# Patient Record
(demographics unavailable — no encounter records)

---

## 2025-01-31 NOTE — PROCEDURE
[FreeTextEntry1] : RLE wound was cleansed with hydrogen Peroxide and Chlorhexidine solution, skin of the lower leg and foot was moisturized with vit. A&D ointment, Silvadene ointment was applied to the wound, leg was wrapped with kerlix and ace bandage.

## 2025-01-31 NOTE — ASSESSMENT
[Foot care/Footwear] : foot care/footwear [Ulcer Care] : ulcer care [FreeTextEntry1] : 73yoM former smoker with PMHx of HLD and LLE BKA years ago, previous hx of RLE cellulitis, R shin traumatic ulcer that was healed with unna boot returns for an evaluation of a new RLE wound that he developed few weeks ago. On exam, patient appears to be more disheveled than usual, poor hygiene. R lateral aspect with ~10x8 cm wound with fibrinous exudate, surrounding dry scaly skin and erythema, non-tender to palpation, poor hygiene. RLE wound was cleansed with hydrogen Peroxide and Chlorhexidine solution, skin of the lower leg and foot was moisturized with vit. A&D ointment, Silvadene ointment was applied to the wound, leg was wrapped with kerlix and ace bandage.  We recommended to keep the dressing on for a week and remove it before coming to the office in 1 week. Prior to his appointment he was instructed to take a shower and wash his right leg/foot really well. We will start him on Augmentin and arrange for VNS as well. If he develops pain, fever, chills, he should go to ED right away.

## 2025-01-31 NOTE — REVIEW OF SYSTEMS
[Limb Swelling] : limb swelling [As Noted in HPI] : as noted in HPI [Skin Lesions] : skin lesion [Negative] : Heme/Lymph [Fever] : no fever [Chills] : no chills [Limb Pain] : no limb pain

## 2025-01-31 NOTE — PHYSICAL EXAM
[Respiratory Effort] : normal respiratory effort [Normal Rate and Rhythm] : normal rate and rhythm [2+] : right 2+ [Ankle Swelling (On Exam)] : present [Ankle Swelling On The Right] : of the right ankle [Ankle Swelling On The Left] : moderate [Alert] : alert [Calm] : calm [Abdomen Tenderness] : ~T ~M No abdominal tenderness [de-identified] : appears more disheveled than before, NAD, ambulates with a cane [de-identified] : +FROM, L SAMMA prosthesis [de-identified] : R lateral aspect with ~10x8 cm wound with fibrinous exudate, surrounding dry scaly skin and erythema, non-tender to palpation, poor hygiene

## 2025-01-31 NOTE — HISTORY OF PRESENT ILLNESS
[FreeTextEntry1] : 73yoM former smoker with PMHx of HLD and LLE BKA years ago, previous hx of RLE cellulitis, R shin traumatic ulcer that was healed with unna boot returns for an evaluation of a new RLE wound that he developed few weeks ago. Patient states that he tried to take care of it himself, however unsuccessful. He denies pain, fever, chills.

## 2025-02-07 NOTE — HISTORY OF PRESENT ILLNESS
[FreeTextEntry1] : 73yoM former smoker with PMHx of HLD and LLE BKA years ago, previous hx of RLE cellulitis, R shin traumatic ulcer that was healed with connie bledsoe who presented last week for an evaluation of a new RLE wound that he developed few weeks ago. He was started on PO antibiotics and after cleansing the wound Silvadene ointment was applied, and the leg was wrapped with kerlix/ace. He returns today for a follow up and states that he feels better, denies pain, fever, chills.

## 2025-02-07 NOTE — PHYSICAL EXAM
[Respiratory Effort] : normal respiratory effort [Normal Rate and Rhythm] : normal rate and rhythm [2+] : right 2+ [Ankle Swelling (On Exam)] : present [Ankle Swelling On The Right] : of the right ankle [Ankle Swelling On The Left] : moderate [Alert] : alert [Calm] : calm [Abdomen Tenderness] : ~T ~M No abdominal tenderness [de-identified] : appears more disheveled than before, NAD, ambulates with a cane [de-identified] : +FROM, L SAMMA prosthesis [de-identified] : R lateral aspect with ~10x8 cm wound with good granulation tissue, improved erythema, non-tender to palpation

## 2025-02-07 NOTE — ASSESSMENT
[Foot care/Footwear] : foot care/footwear [Ulcer Care] : ulcer care [FreeTextEntry1] : 73yoM former smoker with PMHx of HLD and LLE BKA years ago, returns for a f/u on a new RLE wound that he developed few weeks ago. On exam, R lateral aspect with ~10x8 cm wound with good granulation tissue, improved erythema, non-tender to palpation. RLE wound was cleansed with hydrogen Peroxide and Chlorhexidine solution, skin of the lower leg and foot was moisturized with vit. A&D ointment, Silvadene ointment was applied to the wound, leg was wrapped with kerlix and ace bandage.  F/u in 1 week.

## 2025-02-07 NOTE — PROCEDURE
[FreeTextEntry1] : RLE wound was cleansed with hydrogen Peroxide and Chlorhexidine solution, Silvadene ointment was applied to the wound, leg was strapped with unna boot, secured with kerlix and ace bandage.

## 2025-02-14 NOTE — PROCEDURE
[FreeTextEntry1] : RLE wound was cleansed with hydrogen Peroxide and Chlorhexidine solution, leg was strapped with unna boot, secured with kerlix and ace bandage.

## 2025-02-14 NOTE — REVIEW OF SYSTEMS
[Fever] : no fever [Chills] : no chills [Limb Pain] : no limb pain [Limb Swelling] : limb swelling [As Noted in HPI] : as noted in HPI [Skin Lesions] : skin lesion [Negative] : Heme/Lymph

## 2025-02-14 NOTE — HISTORY OF PRESENT ILLNESS
[FreeTextEntry1] : 73yoM former smoker with PMHx of HLD and LLE BKA years ago, previous hx of RLE cellulitis, R shin traumatic ulcer that was healed with connie bledsoe who presented last week for an evaluation of a new RLE wound that he developed few weeks ago. He returns today for a follow up and states that he feels better, denies pain, fever, chills.

## 2025-02-14 NOTE — PHYSICAL EXAM
[Respiratory Effort] : normal respiratory effort [Normal Rate and Rhythm] : normal rate and rhythm [2+] : right 2+ [Ankle Swelling (On Exam)] : present [Ankle Swelling On The Right] : of the right ankle [Ankle Swelling On The Left] : moderate [Abdomen Tenderness] : ~T ~M No abdominal tenderness [Alert] : alert [Calm] : calm [de-identified] :  NAD, ambulates with a cane [de-identified] : +FROM, L SAMMA prosthesis [de-identified] : R lateral aspect with ~10x8 cm wound with good granulation tissue, improved erythema, non-tender to palpation

## 2025-02-14 NOTE — ASSESSMENT
[Foot care/Footwear] : foot care/footwear [FreeTextEntry1] : 73yoM former smoker with PMHx of HLD and LLE BKA years ago, returns for a f/u on a new RLE wound that he developed few weeks ago. On exam, R lateral aspect with ~10x8 cm wound with good granulation tissue, improved erythema, non-tender to palpation. RLE wound was cleansed with hydrogen Peroxide and Chlorhexidine solution, skin of the lower leg and foot was moisturized with vit. A&D ointment, leg was wrapped with kerlix and ace bandage.  F/u in 1 week. [Ulcer Care] : ulcer care

## 2025-02-20 NOTE — ASSESSMENT
[FreeTextEntry1] : 73yoM former smoker with PMHx of HLD and LLE BKA years ago, returns for a f/u on a new RLE wound that he developed few weeks ago. On exam, R lateral aspect with ~10x8 cm wound with good granulation tissue, improved erythema, non-tender to palpation. RLE wound was cleansed with hydrogen Peroxide and Chlorhexidine solution, skin of the lower leg and foot was moisturized with vit. A&D ointment, leg was wrapped with kerlix and ace bandage.  F/u in 1 week. [Foot care/Footwear] : foot care/footwear [Ulcer Care] : ulcer care

## 2025-02-20 NOTE — PHYSICAL EXAM
[Respiratory Effort] : normal respiratory effort [Normal Rate and Rhythm] : normal rate and rhythm [2+] : right 2+ [Ankle Swelling (On Exam)] : present [Ankle Swelling On The Right] : of the right ankle [Ankle Swelling On The Left] : moderate [Abdomen Tenderness] : ~T ~M No abdominal tenderness [Alert] : alert [Calm] : calm [de-identified] :  NAD, ambulates with a cane [de-identified] : +FROM, L SAMMA prosthesis [de-identified] : R lateral aspect with ~10x8 cm wound with good granulation tissue, improved erythema, non-tender to palpation [de-identified] : grossly intact

## 2025-02-27 NOTE — PROCEDURE
[FreeTextEntry1] : RLE wound was cleansed with hydrogen Peroxide and Chlorhexidine solution, leg was wrapped with kerlix and ace bandage.

## 2025-02-27 NOTE — ASSESSMENT
[FreeTextEntry1] : 73yoM former smoker with PMHx of HLD and LLE BKA years ago, returns for a f/u on a new RLE wound that he developed few weeks ago. On exam, R lateral aspect with ~7x8 cm wound with good granulation tissue, partially epithelized, non-tender to palpation RLE wound was cleansed with hydrogen Peroxide and Chlorhexidine solution, leg was wrapped with kerlix and ace bandage.  F/u in 1 week. [Foot care/Footwear] : foot care/footwear [Ulcer Care] : ulcer care

## 2025-02-27 NOTE — PHYSICAL EXAM
[Respiratory Effort] : normal respiratory effort [Normal Rate and Rhythm] : normal rate and rhythm [2+] : right 2+ [Ankle Swelling (On Exam)] : present [Ankle Swelling On The Right] : of the right ankle [Ankle Swelling On The Left] : moderate [Abdomen Tenderness] : ~T ~M No abdominal tenderness [Alert] : alert [Calm] : calm [de-identified] :  NAD, ambulates with a cane [de-identified] : +FROM, L SAMMA prosthesis [de-identified] : R lateral aspect with ~7x8 cm wound with good granulation tissue, partially epithelized,  non-tender to palpation [de-identified] : grossly intact

## 2025-03-06 NOTE — PHYSICAL EXAM
[Respiratory Effort] : normal respiratory effort [Normal Rate and Rhythm] : normal rate and rhythm [2+] : right 2+ [Ankle Swelling (On Exam)] : present [Ankle Swelling On The Right] : of the right ankle [Ankle Swelling On The Left] : moderate [Abdomen Tenderness] : ~T ~M No abdominal tenderness [Alert] : alert [Calm] : calm [de-identified] :  NAD, ambulates with a cane [de-identified] : +FROM, L SAMMA prosthesis [de-identified] : R lateral aspect with ~7x8 cm wound with good granulation tissue, partially epithelized,  non-tender to palpation [de-identified] : grossly intact

## 2025-03-06 NOTE — ASSESSMENT
[FreeTextEntry1] : 73yoM former smoker with PMHx of HLD and LLE BKA years ago, returns for a f/u on a new RLE wound that he developed few weeks ago. On exam, R lateral aspect with ~7x8 cm wound with good granulation tissue, partially epithelized, non-tender to palpation RLE wound was cleansed with hydrogen Peroxide and Chlorhexidine solution, Silvadene ointment was applied to the ulcer, leg was wrapped with kerlix and ace bandage.  We provided pt information about wound care specialists and recommended to schedule an appointment with one of them. F/u in 1 week. [Foot care/Footwear] : foot care/footwear [Ulcer Care] : ulcer care

## 2025-03-06 NOTE — PROCEDURE
[FreeTextEntry1] : RLE wound was cleansed with hydrogen Peroxide and Chlorhexidine solution, lSilvadene ointment was applied to the ulcer, leg was wrapped with kerlix and ace bandage.

## 2025-03-13 NOTE — PROCEDURE
[FreeTextEntry1] : RLE: Silvadene ointment was applied to the ulcer, leg was wrapped with kerlix and ace bandage.

## 2025-03-13 NOTE — HISTORY OF PRESENT ILLNESS
[FreeTextEntry1] : 73yoM former smoker with PMHx of HLD and LLE BKA years ago, previous hx of RLE cellulitis, R shin traumatic ulcer that was healed with unna boot who presented last week for an evaluation of a new RLE wound that he developed few weeks ago. He returns today for a follow up and dressing change, denies pain, fever, chills. Patient scheduled an appointment with a wound care center in 2 weeks.

## 2025-03-13 NOTE — PHYSICAL EXAM
[Respiratory Effort] : normal respiratory effort [Normal Rate and Rhythm] : normal rate and rhythm [2+] : right 2+ [Ankle Swelling (On Exam)] : present [Ankle Swelling On The Right] : of the right ankle [Ankle Swelling On The Left] : moderate [Abdomen Tenderness] : ~T ~M No abdominal tenderness [Alert] : alert [Calm] : calm [de-identified] :  NAD, ambulates with a cane [de-identified] : R lateral aspect with ~3x4 cm wound with good granulation tissue, partially epithelized,  non-tender to palpation [de-identified] : +FROM, L SAMMA prosthesis [de-identified] : grossly intact

## 2025-03-13 NOTE — ASSESSMENT
[FreeTextEntry1] : 73yoM former smoker with PMHx of HLD and LLE BKA years ago, returns for a f/u on a new RLE wound that he developed few weeks ago. On exam, R lateral aspect with ~3x4 cm wound with good granulation tissue, partially epithelized, non-tender to palpation Silvadene ointment was applied to the RLE ulcer, leg was wrapped with kerlix and ace bandage.   F/u in 1 week. [Foot care/Footwear] : foot care/footwear [Ulcer Care] : ulcer care

## 2025-03-20 NOTE — HISTORY OF PRESENT ILLNESS
[FreeTextEntry1] : 73yoM former smoker with PMHx of HLD and LLE BKA years ago, previous hx of RLE cellulitis, R shin traumatic ulcer that was healed with unna renoot who presented last week for an evaluation of a new RLE wound that he developed few weeks ago. He returns today for a follow up and dressing change, denies pain, fever, chills. Patient scheduled an appointment with a wound care center next week.

## 2025-03-20 NOTE — PHYSICAL EXAM
[Respiratory Effort] : normal respiratory effort [Normal Rate and Rhythm] : normal rate and rhythm [2+] : right 2+ [Ankle Swelling (On Exam)] : present [Ankle Swelling On The Right] : of the right ankle [Ankle Swelling On The Left] : moderate [Abdomen Tenderness] : ~T ~M No abdominal tenderness [Alert] : alert [Calm] : calm [de-identified] :  NAD, ambulates with a cane [de-identified] : +FROM, L SAMMA prosthesis [de-identified] : R lateral aspect with ~2x1 cm wound with good granulation tissue, partially epithelized,  non-tender to palpation [de-identified] : grossly intact

## 2025-03-20 NOTE — ASSESSMENT
[FreeTextEntry1] : 73yoM former smoker with PMHx of HLD and LLE BKA years ago, returns for a f/u on a new RLE wound that he developed few weeks ago. He denies pain, fever, chills. On exam, R lateral aspect with ~2x1 cm wound with good granulation tissue, partially epithelized, non-tender to palpation. RLE was strapped with unna boot, secured with kerlix and ace bandage. Patient will see a wound care specialist next week and f/u here prn.  [Foot care/Footwear] : foot care/footwear [Ulcer Care] : ulcer care

## 2025-04-05 NOTE — ASSESSMENT
[FreeTextEntry1] : 73yoM former smoker with PMHx of HLD and LLE BKA years ago, returns for a f/u on a new RLE wound that he developed. He denies pain, fever, chills. On exam, R lateral aspect with ~2x1 cm wound with good granulation tissue, partially epithelized, non-tender to palpation. Silvadene ointment was applied to the wound, RLE was strapped with unna boot, secured with kerlix and ace bandage. Patient will f/u next week.  [Foot care/Footwear] : foot care/footwear [Ulcer Care] : ulcer care

## 2025-04-05 NOTE — HISTORY OF PRESENT ILLNESS
[FreeTextEntry1] : 73yoM former smoker with PMHx of HLD and LLE BKA years ago, previous hx of RLE cellulitis, R shin traumatic ulcer that was healed with unna boot who presented 2 months ago due to a new RLE wound. He has been treated with oral abx's, and local wound care on a weekly basis and was referred to a wound care center. After he was evaluated at [facility] he was recommended to continue unna boot wrappings. He returns today for a follow up and dressing change. He states that his wound is healing well, denies pain, bleeding or oozing from the wound, fever, chills.

## 2025-04-05 NOTE — PHYSICAL EXAM
[Respiratory Effort] : normal respiratory effort [Normal Rate and Rhythm] : normal rate and rhythm [2+] : right 2+ [Ankle Swelling (On Exam)] : present [Ankle Swelling On The Right] : of the right ankle [Ankle Swelling On The Left] : moderate [Abdomen Tenderness] : ~T ~M No abdominal tenderness [Alert] : alert [Calm] : calm [de-identified] :  NAD, ambulates with a cane [de-identified] : +FROM, L SAMMA prosthesis [de-identified] : R lateral aspect with ~2x1 cm wound with good granulation tissue, partially epithelized,  non-tender to palpation [de-identified] : grossly intact

## 2025-04-05 NOTE — PROCEDURE
[FreeTextEntry1] : Silvadene ointment was applied to the wound, RLE was strapped with unna boot, secured with kerlix and ace bandage.

## 2025-04-10 NOTE — HISTORY OF PRESENT ILLNESS
[FreeTextEntry1] : 73yoM former smoker with PMHx of HLD and LLE BKA years ago, previous hx of RLE cellulitis, R shin traumatic ulcer that was healed with unna boot who presented 2 months ago due to a new RLE wound. He has been treated with oral abx's, and local wound care on a weekly basis and was referred to a wound care center. After he was evaluated at [facility] he was recommended to continue unna boot wrappings. He returns today for a follow up and dressing change. He states that his wound is healing well, denies pain, bleeding or oozing from the wound, fever, chills.

## 2025-04-10 NOTE — PHYSICAL EXAM
[Respiratory Effort] : normal respiratory effort [Normal Rate and Rhythm] : normal rate and rhythm [2+] : right 2+ [Ankle Swelling (On Exam)] : present [Ankle Swelling On The Right] : of the right ankle [Ankle Swelling On The Left] : moderate [Abdomen Tenderness] : ~T ~M No abdominal tenderness [Alert] : alert [Calm] : calm [de-identified] :  NAD, ambulates with a cane [de-identified] : +FROM, L SAMMA prosthesis [de-identified] : R lateral aspect with ~2x1 cm wound with good granulation tissue, partially epithelized,  non-tender to palpation [de-identified] : grossly intact

## 2025-04-10 NOTE — ASSESSMENT
[FreeTextEntry1] : 73yoM former smoker with PMHx of HLD and LLE BKA years ago, returns for a f/u on a new RLE wound that he developed. He denies pain, fever, chills. On exam, R lateral aspect with ~2x1 cm wound with good granulation tissue, partially epithelized, non-tender to palpation. RLE was strapped with unna boot, secured with kerlix and ace bandage. Patient will f/u next week.  [Foot care/Footwear] : foot care/footwear [Ulcer Care] : ulcer care

## 2025-04-18 NOTE — ASSESSMENT
[FreeTextEntry1] : 73yoM former smoker with PMHx of HLD and LLE BKA years ago, returns for a f/u on a new RLE wound that he developed. He denies pain, fever, chills. On exam, R lateral aspect with ~1.5x1 cm wound with good granulation tissue, partially epithelized, non-tender to palpation. RLE was strapped with unna boot, secured with kerlix and ace bandage. Patient will f/u next week.  [Foot care/Footwear] : foot care/footwear [Ulcer Care] : ulcer care

## 2025-04-18 NOTE — PHYSICAL EXAM
[Respiratory Effort] : normal respiratory effort [Normal Rate and Rhythm] : normal rate and rhythm [2+] : right 2+ [Ankle Swelling (On Exam)] : present [Ankle Swelling On The Right] : mild [Alert] : alert [Calm] : calm [de-identified] :  NAD, ambulates with a cane [de-identified] : +FROM, L SAMMA prosthesis [de-identified] : R lateral aspect with ~1.5x1 cm wound with good granulation tissue, partially epithelized,  non-tender to palpation [de-identified] : grossly intact

## 2025-04-18 NOTE — HISTORY OF PRESENT ILLNESS
[FreeTextEntry1] : 73yoM former smoker with PMHx of HLD and LLE BKA years ago, previous hx of RLE cellulitis, R shin traumatic ulcer that was healed with unna boot who presented 2 months ago due to a new RLE wound. He has been treated with oral abx's, and local wound care on a weekly basis and was referred to a wound care center. After he was evaluated at [facility] he was recommended to continue unna boot wrappings. He returns today for a follow up and dressing change. He states that his wound is healing well, denies pain, bleeding or oozing from the wound, fever, chills.

## 2025-04-24 NOTE — ADDENDUM
[FreeTextEntry1] : Patient was seen as per the physician's primary plan of care.  Throughout this patient's visit, the supervising physician-Perico Bocanegra MD, was available to assist in the care and medical management of the patient.

## 2025-04-24 NOTE — HISTORY OF PRESENT ILLNESS
[FreeTextEntry1] : 73yoM former smoker with PMHx of HLD and LLE BKA years ago, previous hx of RLE cellulitis, R shin traumatic ulcer that was healed with unna boot who presented 2 months ago due to a new RLE wound. He has been treated with oral abx's, and local wound care on a weekly basis and was referred to a wound care center. After he was evaluated at [facility] he was recommended to continue unna boot wrappings. He returns today for a follow up and dressing change. He states that his wound is healing well, denies pain, bleeding or oozing from the wound, fever, chills.

## 2025-04-24 NOTE — ASSESSMENT
[Foot care/Footwear] : foot care/footwear [Ulcer Care] : ulcer care [FreeTextEntry1] : 73yoM former smoker with PMHx of HLD and LLE BKA years ago, returns for a f/u on a new RLE wound that he developed. He denies pain, fever, chills.  On exam, All RLE wounds healed, skin dry/scaly.  RLE cleansed w/chlorhexidine, moisturized w/vitamin A&D and wrapped w/dry kerlix/ACE.  Recommend pt f/u PRN but he is adamant about seeing Nohemy in a few weeks for f/u.

## 2025-04-24 NOTE — PROCEDURE
[FreeTextEntry1] : RLE cleansed w/chlorhexidine, moisturized w/vitamin A&D and wrapped w/dry kerlix/ACE.

## 2025-04-24 NOTE — PHYSICAL EXAM
[Respiratory Effort] : normal respiratory effort [Normal Rate and Rhythm] : normal rate and rhythm [2+] : right 2+ [Ankle Swelling (On Exam)] : present [Ankle Swelling On The Right] : mild [Alert] : alert [Calm] : calm [No Rash or Lesion] : No rash or lesion [Purpura] : no purpura  [Petechiae] : no petechiae [Skin Ulcer] : no ulcer [Skin Induration] : no induration [de-identified] :  NAD, ambulates with a cane [de-identified] : +FROM, L SAMMA prosthesis [de-identified] : All RLE wounds healed, skin dry/scaly [de-identified] : grossly intact

## 2025-05-08 NOTE — PHYSICAL EXAM
[Respiratory Effort] : normal respiratory effort [Normal Rate and Rhythm] : normal rate and rhythm [2+] : right 2+ [Ankle Swelling (On Exam)] : present [Ankle Swelling On The Right] : mild [No Rash or Lesion] : No rash or lesion [Purpura] : no purpura  [Petechiae] : no petechiae [Skin Ulcer] : no ulcer [Skin Induration] : no induration [Alert] : alert [Calm] : calm [de-identified] :  NAD, ambulates with a cane [de-identified] : +FROM, L SAMMA prosthesis [de-identified] : All RLE wounds healed, no new skin breakdown [de-identified] : grossly intact

## 2025-05-08 NOTE — ASSESSMENT
[FreeTextEntry1] : 73yoM former smoker with PMHx of HLD and LLE BKA years ago, returns for a f/u on RLE wound that he developed. He denies pain, fever, chills. On exam, All RLE wounds healed, skin intact.  RLE was wrapped w/ACE.  Recommend keeping skin clean and moisturized, may f/u PRN. [Foot care/Footwear] : foot care/footwear [Ulcer Care] : ulcer care

## 2025-05-08 NOTE — HISTORY OF PRESENT ILLNESS
[FreeTextEntry1] : 73yoM former smoker with PMHx of HLD and LLE BKA years ago, previous hx of RLE cellulitis, R shin traumatic ulcer that was healed with unna boot who presented 2 months ago due to a new RLE wound. He has been treated with oral abx's, and local wound care on a weekly basis and was referred to a wound care center. After he was evaluated at [facility] he was recommended to continue unna boot wrappings. He returns today for a follow up and dressing change. He states that his wound healed, denies pain, bleeding or oozing from the wound, fever, chills.